# Patient Record
(demographics unavailable — no encounter records)

---

## 2024-10-10 NOTE — PHYSICAL EXAM
[de-identified] : Exam today demonstrates a healed surgical incision.  Her range of motion is within normal limits.  Strength in the upper extremities is preserved.  Light touch sensation is intact.  Lumbar range of motion is about 50% of normal.  She has got tenderness over the SI joints bilaterally right greater than left.  Seated position straight leg raising positive on the left negative on the right grossly her strength is preserved with intact light touch sensation.  Hip range of motion painless. [de-identified] : 4 views of the cervical spine demonstrates postsurgical changes in the form of a fusion C4-C7.  Looks like a solid fusion no evidence of breakage or loosening.  She does have some degeneration of the disc at C 3-4 with maintenance of motion on flexion-extension.  Lumbar spine x-rays demonstrates pedicle screws with interconnecting rods L3-4-5.  There is a solid fusion.  She has degeneration of the disc at L5-S1 with maintenance of motion on flexion-extension.  There are some mild degeneration of the disc at L2-3.

## 2024-10-10 NOTE — HISTORY OF PRESENT ILLNESS
[de-identified] : Dolly returns a for follow-up.  I last saw her back in June at the old practice.  Since I saw her last in terms of her cervical has been doing well.  Neck pain and radicular complaints have resolved.    She been having low back pain on a daily basis but intermittently can be very severe.  She has pain and tingling in both of her legs below the knee but it is worse on the left.  She says 90% of the time is on the left.  10% of the time it is also on the right.  She has a history of a previous lumbar fusion with another surgeon 5 or 6 years ago now.  She has known adjacent segment degeneration at L2-3 and at L5-S1.  Her previous fusion was L3-5. [Stable] : stable

## 2024-10-10 NOTE — ASSESSMENT
[FreeTextEntry1] : Dolly has chronic cervical radiculopathy status post anterior cervical fusion.  She is doing fine from that perspective.  She may at some point develop issues at C3-4 however currently asymptomatic.  Will continue to monitor.  Jaz is much more symptomatic from her lumbar spine.  I think her issues are emanating from the L5-S1 level.  She has got severe foraminal stenosis on her MRI scan from June.  That is on the left.  That correlates with her main symptoms.  She does have some stenosis on the right at L5-S1 but much less so.  Terms of treatment were going to put her back into physical therapy program.  I do not think she is surgical candidate at this point in time.  If she has progression of her symptoms then I would consider anterior lumbar interbody fusion L5-S1.  I would not address the L2-3 level at this point I do not think that is symptomatic.  She seems comfortable with this plan.  All of her questions were addressed.

## 2025-01-30 NOTE — ASSESSMENT
[FreeTextEntry1] : Dolly strained her lumbar spine.  She is actually seeing her pain management doctor today.  I have given her prescription for lidocaine patches.  I do not see any issues with the hardware.  I think this will resolve with time.  She is comfortable with this plan
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
## 2025-01-30 NOTE — PHYSICAL EXAM
[de-identified] : On exam she got a well-healed surgical incision.  There is no palpable muscle spasms.  There is no tenderness to palpation neurologically she is intact [de-identified] : 4 views of the lumbar spine demonstrates pedicle screws L3-L4-L5 with interconnecting rods.  Looks like a solid fusion there is no loosening of the screws and there is no motion on flexion-extension.

## 2025-01-31 NOTE — ASSESSMENT
[FreeTextEntry1] : L lumbar parspinal pain followed by Dr. Haines with history of lumbar fusion. No current radicular findings. Requesting trial of trigger point injections - 2 trigger point injections were given with US guidance to the left lumbar paraspinals - Recommend tylenol and ice as needed for injection site relief - Compression - Resume activities as tolerated - Follow up with Dr. Haines as indicated

## 2025-01-31 NOTE — HISTORY OF PRESENT ILLNESS
[de-identified] : 01/31/2025: Patient is a 68yo female presenting for trigger point injections for her lower back. She is a patient of Dr. Haines and has lumbar fusion history. Recently she has been doing a lot of heavy lifting while moving items at home and her lower back has started to hurt her. She saw Dr. Haines yesterday who recommended a trial of trigger point injections. She continues to do PT and HEP.

## 2025-01-31 NOTE — PROCEDURE
[de-identified] : A pre-procedure verification was performed by asking the patient to state their name, , and the location of the procedure being performed in their own words.  A review of allergies was accomplished through dialog and the patient, ending thus in the full agreement. The risks and benefits were explained and consent were obtained verbally.  Procedure: Trigger point >> Left lumbar paraspinals with ultrasound guidance.  Injection site and surrounding bony landmarks were palpated and marked prior to proceeding. Ultrasound was applied to visualize for vascularity and surgical hardware in the soft tissue. Site was cleaned and prepped in the typical fashion using alcohol swabs. Cold spray used on skin for patient comfort. Needle was advanced into the muscle body from the posterior inferior approach with ultrasound guidance. A combination of 2.5cc Lidocaine w/o Epi and 2.5 cc Marcaine was then injected/peppered into the muscle body. Patient tolerated the procedure well, without significant complications. Minimal (<3cc) blood loss experienced. The procedure was repeated at a more superior tenderpoint

## 2025-01-31 NOTE — PHYSICAL EXAM
[de-identified] : Constitutional: Well developed, well nourished, able to communicate Skin: Intact CV: Peripheral vascular exam grossly normal Pulm: No signs of respiratory distress Psych: Oriented, normal mood and affect  Back: - No obvious deformity - No pain with palpation of spinous processes  - Pain with left sided lumbar paraspinal muculature near well healed incision - No pain with SI palpation - ROM limited throughout flexion, extension, sidebending, and rotation - Distally neurovascularly intact

## 2025-01-31 NOTE — PROCEDURE
[de-identified] : A pre-procedure verification was performed by asking the patient to state their name, , and the location of the procedure being performed in their own words.  A review of allergies was accomplished through dialog and the patient, ending thus in the full agreement. The risks and benefits were explained and consent were obtained verbally.  Procedure: Trigger point >> Left lumbar paraspinals with ultrasound guidance.  Injection site and surrounding bony landmarks were palpated and marked prior to proceeding. Ultrasound was applied to visualize for vascularity and surgical hardware in the soft tissue. Site was cleaned and prepped in the typical fashion using alcohol swabs. Cold spray used on skin for patient comfort. Needle was advanced into the muscle body from the posterior inferior approach with ultrasound guidance. A combination of 2.5cc Lidocaine w/o Epi and 2.5 cc Marcaine was then injected/peppered into the muscle body. Patient tolerated the procedure well, without significant complications. Minimal (<3cc) blood loss experienced. The procedure was repeated at a more superior tenderpoint

## 2025-01-31 NOTE — REVIEW OF SYSTEMS
[Arthralgia] : arthralgia [Joint Pain] : joint pain [Joint Stiffness] : joint stiffness [Joint Swelling] : no joint swelling

## 2025-01-31 NOTE — PROCEDURE
[de-identified] : A pre-procedure verification was performed by asking the patient to state their name, , and the location of the procedure being performed in their own words.  A review of allergies was accomplished through dialog and the patient, ending thus in the full agreement. The risks and benefits were explained and consent were obtained verbally.  Procedure: Trigger point >> Left lumbar paraspinals with ultrasound guidance.  Injection site and surrounding bony landmarks were palpated and marked prior to proceeding. Ultrasound was applied to visualize for vascularity and surgical hardware in the soft tissue. Site was cleaned and prepped in the typical fashion using alcohol swabs. Cold spray used on skin for patient comfort. Needle was advanced into the muscle body from the posterior inferior approach with ultrasound guidance. A combination of 2.5cc Lidocaine w/o Epi and 2.5 cc Marcaine was then injected/peppered into the muscle body. Patient tolerated the procedure well, without significant complications. Minimal (<3cc) blood loss experienced. The procedure was repeated at a more superior tenderpoint

## 2025-01-31 NOTE — REASON FOR VISIT
[Follow-Up Visit] : a follow-up visit for [Initial Visit] : an initial visit for [FreeTextEntry2] : lower back

## 2025-01-31 NOTE — PHYSICAL EXAM
[de-identified] : Constitutional: Well developed, well nourished, able to communicate Skin: Intact CV: Peripheral vascular exam grossly normal Pulm: No signs of respiratory distress Psych: Oriented, normal mood and affect  Back: - No obvious deformity - No pain with palpation of spinous processes  - Pain with left sided lumbar paraspinal muculature near well healed incision - No pain with SI palpation - ROM limited throughout flexion, extension, sidebending, and rotation - Distally neurovascularly intact

## 2025-01-31 NOTE — HISTORY OF PRESENT ILLNESS
[de-identified] : 01/31/2025: Patient is a 70yo female presenting for trigger point injections for her lower back. She is a patient of Dr. Haines and has lumbar fusion history. Recently she has been doing a lot of heavy lifting while moving items at home and her lower back has started to hurt her. She saw Dr. Haines yesterday who recommended a trial of trigger point injections. She continues to do PT and HEP.

## 2025-01-31 NOTE — PHYSICAL EXAM
[de-identified] : Constitutional: Well developed, well nourished, able to communicate Skin: Intact CV: Peripheral vascular exam grossly normal Pulm: No signs of respiratory distress Psych: Oriented, normal mood and affect  Back: - No obvious deformity - No pain with palpation of spinous processes  - Pain with left sided lumbar paraspinal muculature near well healed incision - No pain with SI palpation - ROM limited throughout flexion, extension, sidebending, and rotation - Distally neurovascularly intact

## 2025-01-31 NOTE — HISTORY OF PRESENT ILLNESS
[de-identified] : 01/31/2025: Patient is a 70yo female presenting for trigger point injections for her lower back. She is a patient of Dr. Haines and has lumbar fusion history. Recently she has been doing a lot of heavy lifting while moving items at home and her lower back has started to hurt her. She saw Dr. Haines yesterday who recommended a trial of trigger point injections. She continues to do PT and HEP.

## 2025-03-06 NOTE — PHYSICAL EXAM
[de-identified] : Constitutional: Well developed, well nourished, able to communicate Skin: Intact CV: Peripheral vascular exam grossly normal Pulm: No signs of respiratory distress Psych: Oriented, normal mood and affect  neck: - No obvious deformity - No pain with palpation of spinous processes  - Pain with R sided thoracic paraspinal musculature - No pain with neck ROM throughout - Full bilateral shoulder ROM.  - Distally neurovascularly intact [de-identified] : 2 view R shoulder without fracture, dislocation. AC degenerative changes. ACDF noted without hardware failure

## 2025-03-06 NOTE — ASSESSMENT
[FreeTextEntry1] : R thoracic paraspinal pain followed by Dr. Haines with history of cervical fusion. No current radicular findings. Requesting trial of trigger point injections - 2 trigger point injections were given with US guidance to the R thoracic paraspinals - Recommend tylenol and ice as needed for injection site relief - Compression - HEP given - Resume activities as tolerated - Follow up with Dr. Haines as indicated

## 2025-03-06 NOTE — HISTORY OF PRESENT ILLNESS
[de-identified] : 03/06/2025: Presenting for follow up of lower back pain . Since last visit, lower back pain largely resolved after trigger point injections. Today, she says she has new pain associated with her upper thoracic right side. Pain not worse with neck or shoulder movement. Has tried massage without relief. Hx of 3 level ACDF.   01/31/2025: Patient is a 70yo female presenting for trigger point injections for her lower back. She is a patient of Dr. Haines and has lumbar fusion history. Recently she has been doing a lot of heavy lifting while moving items at home and her lower back has started to hurt her. She saw Dr. Haines yesterday who recommended a trial of trigger point injections. She continues to do PT and HEP.

## 2025-03-06 NOTE — PROCEDURE
[de-identified] : A pre-procedure verification was performed by asking the patient to state their name, , and the location of the procedure being performed in their own words.  A review of allergies was accomplished through dialog and the patient, ending thus in the full agreement. The risks and benefits were explained and consent were obtained verbally.  Procedure: Trigger point >> right thoracic paraspinals with ultrasound guidance.  Injection site and surrounding bony landmarks were palpated and marked prior to proceeding. Ultrasound was applied to visualize for vascularity and surgical hardware in the soft tissue. Site was cleaned and prepped in the typical fashion using alcohol swabs. Cold spray used on skin for patient comfort. Needle was advanced into the muscle body from the posterior inferior approach with ultrasound guidance. A combination of 2.5cc Lidocaine w/o Epi and 2.5 cc Marcaine was then injected/peppered into the muscle body. Patient tolerated the procedure well, without significant complications. Minimal (<3cc) blood loss experienced. The procedure was repeated at a more superior tenderpoint. Pictures were saved into the chart.

## 2025-04-28 NOTE — ASSESSMENT
[FreeTextEntry1] : Dolly has chronic cervical and chronic lumbar radiculopathy.  I renewed her physical therapy today in the office.  We talked about other modalities to try.  Follow-up with me now in 3 months.

## 2025-04-28 NOTE — PHYSICAL EXAM
[de-identified] : Her exam today is benign.  Neurologically she is intact. [de-identified] : 4 views of the lumbar spine demonstrates a solid fusion no evidence of breakage or loosening there is degeneration of the L5-S1 disc and facet joints.

## 2025-04-28 NOTE — HISTORY OF PRESENT ILLNESS
[de-identified] : Dolly returns today for follow-up.  She has issues with both her neck and her lower back.  Their chronic issues flared up recently.  She actually has not been to physical therapy for quite some time.  Her neck is basically stiffness.  She can bend and turn easier towards her left than the right.  No radiating pain.  Her lower back is the same sort of chronic midline pain without sciatica.

## 2025-07-03 NOTE — ASSESSMENT
[FreeTextEntry1] : Dolly has cervical degenerative disc disease with spondylosis.  I think if she is feeling better I would not recommend any additional treatments.  Continue with the physical therapy.  Again her bigger complaint is the pain radiating into her legs.  Perhaps she has stenosis above or below where her prior surgery was.  Recommending an MRI scan and follow-up with me.  She is amenable to this plan.  All questions addressed.

## 2025-07-03 NOTE — PHYSICAL EXAM
[de-identified] : She looks uncomfortable but not in acute distress.  Cervical range of motion is within normal limits.  No tenderness to palpation.  Motor strength is preserved.  Lumbar spine no palpable muscle spasms no tenderness.  Neurologically intact.  Hip range of motion painless. [de-identified] : 4 views of the cervical spine taken today demonstrates postsurgical changes intact hardware looks like a solid fusion.  She has degeneration at C4-5 with collapse and anterolisthesis.

## 2025-07-03 NOTE — HISTORY OF PRESENT ILLNESS
[de-identified] : Dolly returns in for follow-up.  Since I saw her last she has been having issues with both her neck and her lower back.  Back in June her neck locked up on her.  She had very significant pain.  She had headaches she had pain around the jaw she was very frustrated by it yesterday she started with some physical therapy again and today she is feeling better in terms of her neck pain.  Her motion is better.  No headaches.  Her bigger complaint today is her lower back.  She has pain radiating into both of her legs.  She has a history of a prior L3-4 and L4-5 laminectomy and fusion.